# Patient Record
Sex: MALE | Race: OTHER
[De-identification: names, ages, dates, MRNs, and addresses within clinical notes are randomized per-mention and may not be internally consistent; named-entity substitution may affect disease eponyms.]

---

## 2019-02-25 ENCOUNTER — HOSPITAL ENCOUNTER (EMERGENCY)
Dept: HOSPITAL 56 - MW.ED | Age: 34
Discharge: HOME | End: 2019-02-25
Payer: SELF-PAY

## 2019-02-25 DIAGNOSIS — J40: Primary | ICD-10-CM

## 2019-02-25 PROCEDURE — 96372 THER/PROPH/DIAG INJ SC/IM: CPT

## 2019-02-25 PROCEDURE — 93005 ELECTROCARDIOGRAM TRACING: CPT

## 2019-02-25 PROCEDURE — 71045 X-RAY EXAM CHEST 1 VIEW: CPT

## 2019-02-25 PROCEDURE — 99285 EMERGENCY DEPT VISIT HI MDM: CPT

## 2019-02-25 PROCEDURE — 87804 INFLUENZA ASSAY W/OPTIC: CPT

## 2019-02-25 NOTE — CR
INDICATION:



Chest pain and dyspnea 



COMPARISON:



None available. 



FINDINGS:



An erect portable single view of the chest was obtained at 22 42 hours. 



The lungs are clear. No focal or diffuse infiltrates are present.



The heart is normal in size. 



The mediastinum is normal in appearance. 



The osseous structures are normal in appearance for the patient`s age. 



IMPRESSION:



Normal chest single view.



Dictated by Carl Lizama MD @ Feb 25 2019 11:06PM



Signed by Dr. Carl Lizama @ Feb 25 2019 11:07PM

## 2019-02-25 NOTE — EDM.PDOC
ED HPI GENERAL MEDICAL PROBLEM





- General


Stated Complaint: PT HAS DIFFICULTY BREATHING


Time Seen by Provider: 02/25/19 21:56


Source of Information: Reports: Patient





- History of Present Illness


INITIAL COMMENTS - FREE TEXT/NARRATIVE: 





HISTORY AND PHYSICAL:


History of present illness:


[Patient presents with chest pain on lower rib margin on the left for one week 

as well as shortness of breath and slight wheeze on expiration he has 

diminished breath sounds throughout on exam along with intermittent cough no 

fever nausea vomiting chills sweats chest pain is not associated with radiation 

to arm neck or jaw no diaphoresis


]


Review of systems: 


As per history of present illness and below otherwise all systems reviewed and 

negative.


Past medical history: 


As per history of present illness and as reviewed below otherwise 

noncontributory.


Surgical history: 


As per history of present illness and as reviewed below otherwise 

noncontributory.


Social history: 


No reported history of drug or alcohol abuse.


Family history: 


As per history of present illness and as reviewed below otherwise 

noncontributory.





Physical exam:


HEENT: Atraumatic, normocephalic, pupils reactive, negative for conjunctival 

pallor or scleral icterus, mucous membranes moist, throat clear, neck supple, 

nontender, trachea midline.


Lungs: Clear to auscultation, breath sounds equal bilaterally, chest nontender.


Heart: S1S2, regular, negative for clicks, rubs, or JVD.


Abdomen: Soft, nondistended, nontender. Negative for masses or 

hepatosplenomegaly. Negative for costovertebral tenderness.


Pelvis: Stable nontender.


Genitourinary: Deferred.


Rectal: Deferred.


Extremities: Atraumatic, negative for cords or calf pain. Neurovascular 

unremarkable.


Neuro: Awake, alert, oriented. Cranial nerves II through XII unremarkable. 

Cerebellum unremarkable. Motor and sensory unremarkable throughout. Exam 

nonfocal.





Diagnostics:


[Influenza


Chest 1 view


EKG


]


Therapeutics:


[ DuoNeb


Solu-Medrol 125 mg IM





Azithromycin


HFA


Medrol Dosepak


]


Impression: 


bronchitis


Definitive disposition and diagnosis as appropriate pending reevaluation and 

review of above.


  ** chest pain


Pain Score (Numeric/FACES): 4





- Related Data


 Allergies











Allergy/AdvReac Type Severity Reaction Status Date / Time


 


No Known Allergies Allergy   Verified 02/25/19 21:57











Home Meds: 


 Home Meds





. [No Known Home Meds]  02/25/19 [History]











ED ROS GENERAL





- Review of Systems


Review Of Systems: See Below





ED EXAM, GENERAL





- Physical Exam


Exam: See Below





Course





- Vital Signs


Last Recorded V/S: 


 Last Vital Signs











Temp  97 F   02/25/19 21:55


 


Pulse  76   02/25/19 21:55


 


Resp  18   02/25/19 21:55


 


BP  130/87   02/25/19 21:55


 


Pulse Ox  98   02/25/19 21:55














- Orders/Labs/Meds


Orders: 


 Active Orders 24 hr











 Category Date Time Status


 


 EKG Documentation Completion [RC] STAT Care  02/25/19 21:57 Active


 


 RT Aerosol Therapy [RC] ASDIRECTED Care  02/25/19 21:55 Active


 


 Chest 1V Frontal [CR] Stat Exams  02/25/19 21:56 Ordered


 


 Azithromycin [Zithromax] Med  02/25/19 22:59 Stat





 500 mg PO NOW STA   











Meds: 


Medications














Discontinued Medications














Generic Name Dose Route Start Last Admin





  Trade Name Joseq  PRN Reason Stop Dose Admin


 


Albuterol/Ipratropium  3 ml  02/25/19 21:55  02/25/19 22:11





  Duoneb 3.0-0.5 Mg/3 Ml  NEB  02/25/19 21:56  3 ml





  ONETIME ONE   Administration





     





     





     





     


 


Methylprednisolone Sodium Succinate  125 mg  02/25/19 21:55  02/25/19 22:09





  Solu-Medrol  IM  02/25/19 21:56  125 mg





  ONETIME ONE   Administration





     





     





     





     














Departure





- Departure


Time of Disposition: 23:01


Disposition: Home, Self-Care 01


Condition: Good


Clinical Impression: 


 Bronchitis








- Discharge Information


Additional Instructions: 


The following information is given to patients seen in the emergency department 

who are being discharged to home. This information is to outline your options 

for follow-up care. We provide all patients seen in our emergency department 

with a follow-up referral.





The need for follow-up, as well as the timing and circumstances, are variable 

depending upon the specifics of your emergency department visit.





If you don't have a primary care physician on staff, we will provide you with a 

referral. We always advise you to contact your personal physician following an 

emergency department visit to inform them of the circumstance of the visit and 

for follow-up with them and/or the need for any referrals to a consulting 

specialist.





The emergency department will also refer you to a specialist when appropriate. 

This referral assures that you have the opportunity for follow-up care with a 

specialist. All of these measure are taken in an effort to provide you with 

optimal care, which includes your follow-up.





Under all circumstances we always encourage you to contact your private 

physician who remains a resource for coordinating your care. When calling for 

follow-up care, please make the office aware that this follow-up is from your 

recent emergency room visit. If for any reason you are refused follow-up, 

please contact the [facility] emergency department at [phone] 

and asked to speak to the emergency department charge nurse.








- My Orders


Last 24 Hours: 


My Active Orders





02/25/19 21:55


RT Aerosol Therapy [RC] ASDIRECTED 





02/25/19 21:56


Chest 1V Frontal [CR] Stat 





02/25/19 21:57


EKG Documentation Completion [RC] STAT 





02/25/19 22:59


Azithromycin [Zithromax]   500 mg PO NOW STA 














- Assessment/Plan


Last 24 Hours: 


My Active Orders





02/25/19 21:55


RT Aerosol Therapy [RC] ASDIRECTED 





02/25/19 21:56


Chest 1V Frontal [CR] Stat 





02/25/19 21:57


EKG Documentation Completion [RC] STAT 





02/25/19 22:59


Azithromycin [Zithromax]   500 mg PO NOW STA